# Patient Record
Sex: FEMALE | Race: WHITE | NOT HISPANIC OR LATINO | URBAN - METROPOLITAN AREA
[De-identification: names, ages, dates, MRNs, and addresses within clinical notes are randomized per-mention and may not be internally consistent; named-entity substitution may affect disease eponyms.]

---

## 2017-01-09 PROBLEM — Z00.00 ENCOUNTER FOR PREVENTIVE HEALTH EXAMINATION: Status: ACTIVE | Noted: 2017-01-09

## 2017-01-19 ENCOUNTER — OUTPATIENT (OUTPATIENT)
Dept: OUTPATIENT SERVICES | Age: 32
LOS: 1 days | End: 2017-01-19

## 2017-01-23 ENCOUNTER — APPOINTMENT (OUTPATIENT)
Dept: PEDIATRIC HEMATOLOGY/ONCOLOGY | Facility: CLINIC | Age: 32
End: 2017-01-23

## 2017-01-23 ENCOUNTER — OUTPATIENT (OUTPATIENT)
Dept: OUTPATIENT SERVICES | Age: 32
LOS: 1 days | End: 2017-01-23

## 2017-01-23 VITALS
DIASTOLIC BLOOD PRESSURE: 80 MMHG | RESPIRATION RATE: 16 BRPM | BODY MASS INDEX: 25.3 KG/M2 | WEIGHT: 132.28 LBS | HEIGHT: 60.55 IN | SYSTOLIC BLOOD PRESSURE: 125 MMHG | TEMPERATURE: 98.24 F | HEART RATE: 110 BPM

## 2017-01-23 LAB
BASOPHILS # BLD AUTO: 0 K/UL — SIGNIFICANT CHANGE UP (ref 0–0.2)
BASOPHILS NFR BLD AUTO: 0 % — SIGNIFICANT CHANGE UP (ref 0–2)
EOSINOPHIL # BLD AUTO: 0.13 K/UL — SIGNIFICANT CHANGE UP (ref 0–0.5)
EOSINOPHIL NFR BLD AUTO: 2.9 % — SIGNIFICANT CHANGE UP (ref 0–6)
HCT VFR BLD CALC: 26.3 % — LOW (ref 34.5–45)
HGB BLD-MCNC: 9.6 G/DL — LOW (ref 11.5–15.5)
LYMPHOCYTES # BLD AUTO: 2.04 K/UL — SIGNIFICANT CHANGE UP (ref 1–3.3)
LYMPHOCYTES # BLD AUTO: 47.1 % — HIGH (ref 13–44)
MCHC RBC-ENTMCNC: 29 PG — SIGNIFICANT CHANGE UP (ref 27–34)
MCHC RBC-ENTMCNC: 36.4 % — HIGH (ref 32–36)
MCV RBC AUTO: 79.6 FL — LOW (ref 80–100)
MONOCYTES # BLD AUTO: 0.22 K/UL — SIGNIFICANT CHANGE UP (ref 0–0.9)
MONOCYTES NFR BLD AUTO: 5 % — SIGNIFICANT CHANGE UP (ref 2–14)
NEUTROPHILS # BLD AUTO: 1.95 K/UL — SIGNIFICANT CHANGE UP (ref 1.8–7.4)
NEUTROPHILS NFR BLD AUTO: 45 % — SIGNIFICANT CHANGE UP (ref 43–77)
PERFORM SLIDE REVIEW?: YES — SIGNIFICANT CHANGE UP
PLATELET # BLD AUTO: 87 K/UL — LOW (ref 150–400)
RBC # BLD: 3.31 M/UL — LOW (ref 3.8–5.2)
RBC # FLD: 14.3 % — SIGNIFICANT CHANGE UP (ref 10.3–14.5)
RETICS/RBC NFR: < 0.5 % — LOW (ref 0.5–2.5)
WBC # BLD: 4.3 K/UL — SIGNIFICANT CHANGE UP (ref 3.8–10.5)
WBC # FLD AUTO: 4.3 K/UL — SIGNIFICANT CHANGE UP (ref 3.8–10.5)

## 2017-01-26 DIAGNOSIS — D61.01 CONSTITUTIONAL (PURE) RED BLOOD CELL APLASIA: ICD-10-CM

## 2024-02-28 ENCOUNTER — OUTPATIENT (OUTPATIENT)
Dept: OUTPATIENT SERVICES | Age: 39
LOS: 1 days | Discharge: HOME | End: 2024-02-28
Payer: OTHER GOVERNMENT

## 2024-02-29 ENCOUNTER — RESULT REVIEW (OUTPATIENT)
Age: 39
End: 2024-02-29

## 2024-02-29 ENCOUNTER — APPOINTMENT (OUTPATIENT)
Dept: PEDIATRIC HEMATOLOGY/ONCOLOGY | Facility: CLINIC | Age: 39
End: 2024-02-29
Payer: OTHER GOVERNMENT

## 2024-02-29 VITALS
SYSTOLIC BLOOD PRESSURE: 135 MMHG | WEIGHT: 121.92 LBS | RESPIRATION RATE: 20 BRPM | DIASTOLIC BLOOD PRESSURE: 77 MMHG | BODY MASS INDEX: 23.32 KG/M2 | HEART RATE: 100 BPM | HEIGHT: 60.55 IN | TEMPERATURE: 98.24 F | OXYGEN SATURATION: 98 %

## 2024-02-29 DIAGNOSIS — E83.39 OTHER DISORDERS OF PHOSPHORUS METABOLISM: ICD-10-CM

## 2024-02-29 DIAGNOSIS — Z79.52 LONG TERM (CURRENT) USE OF SYSTEMIC STEROIDS: ICD-10-CM

## 2024-02-29 DIAGNOSIS — Z80.7 FAMILY HISTORY OF OTHER MALIGNANT NEOPLASMS OF LYMPHOID, HEMATOPOIETIC AND RELATED TISSUES: ICD-10-CM

## 2024-02-29 DIAGNOSIS — E55.9 VITAMIN D DEFICIENCY, UNSPECIFIED: ICD-10-CM

## 2024-02-29 DIAGNOSIS — R62.52 OTHER SPECIFIED CONGENITAL MALFORMATION SYNDROMES, NOT ELSEWHERE CLASSIFIED: ICD-10-CM

## 2024-02-29 DIAGNOSIS — Q87.89 OTHER SPECIFIED CONGENITAL MALFORMATION SYNDROMES, NOT ELSEWHERE CLASSIFIED: ICD-10-CM

## 2024-02-29 DIAGNOSIS — J30.2 OTHER SEASONAL ALLERGIC RHINITIS: ICD-10-CM

## 2024-02-29 DIAGNOSIS — R11.2 NAUSEA WITH VOMITING, UNSPECIFIED: ICD-10-CM

## 2024-02-29 DIAGNOSIS — D64.9 ANEMIA, UNSPECIFIED: ICD-10-CM

## 2024-02-29 LAB
BASOPHILS # BLD AUTO: 0 K/UL — SIGNIFICANT CHANGE UP (ref 0–0.2)
BASOPHILS NFR BLD AUTO: 0 % — SIGNIFICANT CHANGE UP (ref 0–2)
EOSINOPHIL # BLD AUTO: 0.05 K/UL — SIGNIFICANT CHANGE UP (ref 0–0.5)
EOSINOPHIL NFR BLD AUTO: 1.7 % — SIGNIFICANT CHANGE UP (ref 0–6)
HCT VFR BLD CALC: 38.4 % — SIGNIFICANT CHANGE UP (ref 34.5–45)
HGB BLD-MCNC: 13.6 G/DL — SIGNIFICANT CHANGE UP (ref 11.5–15.5)
IANC: 1.38 K/UL — LOW (ref 1.8–7.4)
IMM GRANULOCYTES NFR BLD AUTO: 0.3 % — SIGNIFICANT CHANGE UP (ref 0–0.9)
LYMPHOCYTES # BLD AUTO: 1.29 K/UL — SIGNIFICANT CHANGE UP (ref 1–3.3)
LYMPHOCYTES # BLD AUTO: 44.5 % — HIGH (ref 13–44)
MCHC RBC-ENTMCNC: 27.6 PG — SIGNIFICANT CHANGE UP (ref 27–34)
MCHC RBC-ENTMCNC: 35.4 GM/DL — SIGNIFICANT CHANGE UP (ref 32–36)
MCV RBC AUTO: 78 FL — LOW (ref 80–100)
MONOCYTES # BLD AUTO: 0.17 K/UL — SIGNIFICANT CHANGE UP (ref 0–0.9)
MONOCYTES NFR BLD AUTO: 5.9 % — SIGNIFICANT CHANGE UP (ref 2–14)
NEUTROPHILS # BLD AUTO: 1.38 K/UL — LOW (ref 1.8–7.4)
NEUTROPHILS NFR BLD AUTO: 47.6 % — SIGNIFICANT CHANGE UP (ref 43–77)
NRBC # BLD: 0 /100 WBCS — SIGNIFICANT CHANGE UP (ref 0–0)
PLATELET # BLD AUTO: 42 K/UL — LOW (ref 150–400)
PMV BLD: 10.4 FL — SIGNIFICANT CHANGE UP (ref 7–13)
RBC # BLD: 4.92 M/UL — SIGNIFICANT CHANGE UP (ref 3.8–5.2)
RBC # BLD: 4.92 M/UL — SIGNIFICANT CHANGE UP (ref 3.8–5.2)
RBC # FLD: 13.8 % — SIGNIFICANT CHANGE UP (ref 10.3–14.5)
RETICS #: 8.4 K/UL — LOW (ref 25–125)
RETICS/RBC NFR: 0.2 % — LOW (ref 0.5–2.5)
WBC # BLD: 2.9 K/UL — LOW (ref 3.8–10.5)
WBC # FLD AUTO: 2.9 K/UL — LOW (ref 3.8–10.5)

## 2024-02-29 PROCEDURE — 99205 OFFICE O/P NEW HI 60 MIN: CPT

## 2024-03-01 DIAGNOSIS — Z78.9 OTHER SPECIFIED HEALTH STATUS: ICD-10-CM

## 2024-03-12 LAB — MISCELLANEOUS TEST NAME: SIGNIFICANT CHANGE UP

## 2024-04-18 ENCOUNTER — RESULT REVIEW (OUTPATIENT)
Age: 39
End: 2024-04-18

## 2024-04-18 ENCOUNTER — NON-APPOINTMENT (OUTPATIENT)
Age: 39
End: 2024-04-18

## 2024-04-18 ENCOUNTER — APPOINTMENT (OUTPATIENT)
Dept: PEDIATRIC HEMATOLOGY/ONCOLOGY | Facility: CLINIC | Age: 39
End: 2024-04-18
Payer: OTHER GOVERNMENT

## 2024-04-18 VITALS
DIASTOLIC BLOOD PRESSURE: 53 MMHG | OXYGEN SATURATION: 98 % | TEMPERATURE: 97.88 F | RESPIRATION RATE: 18 BRPM | SYSTOLIC BLOOD PRESSURE: 111 MMHG | BODY MASS INDEX: 21.6 KG/M2 | WEIGHT: 112.66 LBS | HEART RATE: 85 BPM

## 2024-04-18 DIAGNOSIS — D69.6 THROMBOCYTOPENIA, UNSPECIFIED: ICD-10-CM

## 2024-04-18 DIAGNOSIS — E83.111 HEMOCHROMATOSIS DUE TO REPEATED RED BLOOD CELL TRANSFUSIONS: ICD-10-CM

## 2024-04-18 DIAGNOSIS — D61.01 CONSTITUTIONAL (PURE) RED BLOOD CELL APLASIA: ICD-10-CM

## 2024-04-18 PROBLEM — Z80.7 FAMILY HISTORY OF LYMPHOMA: Status: ACTIVE | Noted: 2024-04-18

## 2024-04-18 PROBLEM — E83.39 HYPOPHOSPHATEMIA: Status: ACTIVE | Noted: 2024-04-18

## 2024-04-18 PROBLEM — E55.9 VITAMIN D INSUFFICIENCY: Status: ACTIVE | Noted: 2024-04-18

## 2024-04-18 PROBLEM — Q87.89 SHORT STATURE ASSOCIATED WITH CONGENITAL SYNDROME: Status: ACTIVE | Noted: 2024-04-18

## 2024-04-18 PROBLEM — Z79.52 LONG-TERM CORTICOSTEROID USE: Status: RESOLVED | Noted: 2024-04-18 | Resolved: 2024-04-18

## 2024-04-18 PROBLEM — J30.2 SEASONAL ALLERGIES: Status: ACTIVE | Noted: 2024-04-18

## 2024-04-18 PROBLEM — D64.9 TRANSFUSION-DEPENDENT ANEMIA: Status: ACTIVE | Noted: 2024-04-18

## 2024-04-18 PROBLEM — R11.2 NAUSEA AND/OR VOMITING: Status: ACTIVE | Noted: 2024-04-18

## 2024-04-18 PROCEDURE — 99215 OFFICE O/P EST HI 40 MIN: CPT

## 2024-04-18 RX ORDER — LIDOCAINE HCL 20 MG/ML
3 VIAL (ML) INJECTION ONCE
Refills: 0 | Status: DISCONTINUED | OUTPATIENT
Start: 2024-04-19 | End: 2024-04-30

## 2024-04-18 RX ORDER — ONDANSETRON 4 MG/1
4 TABLET, ORALLY DISINTEGRATING ORAL
Refills: 0 | Status: ACTIVE | COMMUNITY

## 2024-04-18 RX ORDER — DEFEROXAMINE MESYLATE 500 MG/1
500 INJECTION, POWDER, LYOPHILIZED, FOR SOLUTION INTRAMUSCULAR; INTRAVENOUS; SUBCUTANEOUS
Refills: 0 | Status: ACTIVE | COMMUNITY
Start: 2024-04-18

## 2024-04-18 RX ORDER — HEPARIN SODIUM 5000 [USP'U]/ML
4000 INJECTION INTRAVENOUS; SUBCUTANEOUS ONCE
Refills: 0 | Status: DISCONTINUED | OUTPATIENT
Start: 2024-04-19 | End: 2024-04-30

## 2024-04-18 RX ORDER — UBIDECARENONE/VIT E ACET 100MG-5
50 MCG CAPSULE ORAL
Refills: 0 | Status: ACTIVE | COMMUNITY

## 2024-04-18 RX ORDER — CETIRIZINE HCL 10 MG
TABLET ORAL
Refills: 0 | Status: ACTIVE | COMMUNITY

## 2024-04-18 RX ORDER — POTASSIUM & SODIUM PHOSPHATES POWDER PACK 280-160-250 MG 280-160-250 MG
PACK ORAL
Refills: 0 | Status: ACTIVE | COMMUNITY

## 2024-04-18 NOTE — HISTORY OF PRESENT ILLNESS
[de-identified] : Mohini is a 38 year old female with [id number]-ivyzute Eleanor Blackfan anemia who presents to [facility] for evaluation. Mohini presented at 2 months of age for a well baby check and was noted to be pale, Hb 4.5/ Hct 12.9. Hct at birth had been 44%. BM aspiration done revealed a BM devoid of erythroid precursors. PE significant for webbed neck but no other anomalies. The clinical diagnosis of  was made, she was transfused and started on Prednisone 2 mg/kg.day. She was eventually weaned to a chronic daily dose. At age 4 years she received another transfusion while her dose was being tapered to improve her growth. From age 4-11 years she remained on a stable Pred dose of 5 mg qod. From age 11-13 she had many pulses of Pred as her Hb would drop, she would respond but then drop her Hb again when the Pred was weaned further. At a visit with us on May 7, 1999 she was on Pred 10 mg qod and a discussion was ongoing about a Pred hiatus to promote growth. She eventually started chronic transfusions every 3-4 weeks in 2006.  We saw the patient on Jan 23, 2017 and at that time Mohini was on transfusions

## 2024-04-18 NOTE — REASON FOR VISIT
[New Patient/Consultation] : a new patient/consultation for [Eleanor Blackfan Anemia] : eleanor blackfan anemia [Family Member] : family member [Patient] : patient

## 2024-04-19 ENCOUNTER — APPOINTMENT (OUTPATIENT)
Dept: PEDIATRIC HEMATOLOGY/ONCOLOGY | Facility: CLINIC | Age: 39
End: 2024-04-19
Payer: OTHER GOVERNMENT

## 2024-04-19 ENCOUNTER — LABORATORY RESULT (OUTPATIENT)
Age: 39
End: 2024-04-19

## 2024-04-19 VITALS
SYSTOLIC BLOOD PRESSURE: 111 MMHG | OXYGEN SATURATION: 100 % | RESPIRATION RATE: 18 BRPM | HEART RATE: 91 BPM | TEMPERATURE: 98 F | HEART RATE: 91 BPM | SYSTOLIC BLOOD PRESSURE: 111 MMHG | DIASTOLIC BLOOD PRESSURE: 71 MMHG | BODY MASS INDEX: 21.72 KG/M2 | OXYGEN SATURATION: 100 % | RESPIRATION RATE: 18 BRPM | HEIGHT: 60.75 IN | WEIGHT: 113.54 LBS | DIASTOLIC BLOOD PRESSURE: 71 MMHG | TEMPERATURE: 97.88 F | WEIGHT: 113.54 LBS | HEIGHT: 60.75 IN

## 2024-04-19 VITALS
SYSTOLIC BLOOD PRESSURE: 111 MMHG | RESPIRATION RATE: 18 BRPM | TEMPERATURE: 98 F | HEART RATE: 81 BPM | DIASTOLIC BLOOD PRESSURE: 67 MMHG | OXYGEN SATURATION: 99 %

## 2024-04-19 PROBLEM — E83.111 IRON OVERLOAD DUE TO REPEATED RED BLOOD CELL TRANSFUSIONS: Status: ACTIVE | Noted: 2017-06-24

## 2024-04-19 PROBLEM — D61.01 ANEMIA, DIAMOND-BLACKFAN: Status: ACTIVE | Noted: 2017-06-24

## 2024-04-19 PROBLEM — D69.6 THROMBOCYTOPENIA: Status: ACTIVE | Noted: 2024-04-18

## 2024-04-19 PROCEDURE — 38220 DX BONE MARROW ASPIRATIONS: CPT | Mod: 59

## 2024-04-19 PROCEDURE — 85097 BONE MARROW INTERPRETATION: CPT

## 2024-04-19 PROCEDURE — 88189 FLOWCYTOMETRY/READ 16 & >: CPT

## 2024-04-19 PROCEDURE — 88313 SPECIAL STAINS GROUP 2: CPT | Mod: 26

## 2024-04-19 PROCEDURE — 88341 IMHCHEM/IMCYTCHM EA ADD ANTB: CPT | Mod: 26

## 2024-04-19 PROCEDURE — 88342 IMHCHEM/IMCYTCHM 1ST ANTB: CPT | Mod: 26,59

## 2024-04-19 PROCEDURE — 88305 TISSUE EXAM BY PATHOLOGIST: CPT | Mod: 26

## 2024-04-19 PROCEDURE — ZZZZZ: CPT

## 2024-04-19 PROCEDURE — 88291 CYTO/MOLECULAR REPORT: CPT

## 2024-04-19 PROCEDURE — 38221 DX BONE MARROW BIOPSIES: CPT | Mod: 59

## 2024-04-19 RX ORDER — ACETAMINOPHEN 500 MG
650 TABLET ORAL ONCE
Refills: 0 | Status: COMPLETED | OUTPATIENT
Start: 2024-04-19 | End: 2024-04-19

## 2024-04-19 RX ADMIN — Medication 650 MILLIGRAM(S): at 12:26

## 2024-04-23 LAB — CHROM ANALY INTERPHASE BLD FISH-IMP: SIGNIFICANT CHANGE UP

## 2024-04-24 LAB — HEMATOPATHOLOGY REPORT: SIGNIFICANT CHANGE UP

## 2024-04-25 LAB — CHROM ANALY OVERALL INTERP SPEC-IMP: SIGNIFICANT CHANGE UP

## 2024-06-11 ENCOUNTER — TRANSCRIPTION ENCOUNTER (OUTPATIENT)
Age: 39
End: 2024-06-11